# Patient Record
Sex: FEMALE | Race: WHITE
[De-identification: names, ages, dates, MRNs, and addresses within clinical notes are randomized per-mention and may not be internally consistent; named-entity substitution may affect disease eponyms.]

---

## 2018-08-17 ENCOUNTER — HOSPITAL ENCOUNTER (OUTPATIENT)
Dept: HOSPITAL 74 - JER | Age: 38
Setting detail: OBSERVATION
LOS: 3 days | Discharge: HOME | End: 2018-08-20
Attending: SPECIALIST | Admitting: SPECIALIST
Payer: COMMERCIAL

## 2018-08-17 VITALS — BODY MASS INDEX: 25.6 KG/M2

## 2018-08-17 DIAGNOSIS — Z91.018: ICD-10-CM

## 2018-08-17 DIAGNOSIS — R00.1: ICD-10-CM

## 2018-08-17 DIAGNOSIS — N13.2: Primary | ICD-10-CM

## 2018-08-17 DIAGNOSIS — Z91.010: ICD-10-CM

## 2018-08-17 DIAGNOSIS — R11.2: ICD-10-CM

## 2018-08-17 LAB
ALBUMIN SERPL-MCNC: 4.1 G/DL (ref 3.4–5)
ALP SERPL-CCNC: 78 U/L (ref 45–117)
ALT SERPL-CCNC: 28 U/L (ref 12–78)
ANION GAP SERPL CALC-SCNC: 10 MMOL/L (ref 8–16)
APPEARANCE UR: (no result)
AST SERPL-CCNC: 16 U/L (ref 15–37)
BILIRUB SERPL-MCNC: 0.6 MG/DL (ref 0.2–1)
BILIRUB UR STRIP.AUTO-MCNC: NEGATIVE MG/DL
BUN SERPL-MCNC: 19 MG/DL (ref 7–18)
CALCIUM SERPL-MCNC: 9.1 MG/DL (ref 8.5–10.1)
CHLORIDE SERPL-SCNC: 104 MMOL/L (ref 98–107)
CO2 SERPL-SCNC: 25 MMOL/L (ref 21–32)
COLOR UR: YELLOW
CREAT SERPL-MCNC: 0.8 MG/DL (ref 0.55–1.02)
DEPRECATED RDW RBC AUTO: 13.4 % (ref 11.6–15.6)
GLUCOSE SERPL-MCNC: 138 MG/DL (ref 74–106)
HCT VFR BLD CALC: 38.1 % (ref 32.4–45.2)
HGB BLD-MCNC: 12.9 GM/DL (ref 10.7–15.3)
KETONES UR QL STRIP: (no result)
LEUKOCYTE ESTERASE UR QL STRIP.AUTO: NEGATIVE
MCH RBC QN AUTO: 29.3 PG (ref 25.7–33.7)
MCHC RBC AUTO-ENTMCNC: 33.8 G/DL (ref 32–36)
MCV RBC: 86.7 FL (ref 80–96)
NITRITE UR QL STRIP: NEGATIVE
PH UR: 5 [PH] (ref 5–8)
PLATELET # BLD AUTO: 316 K/MM3 (ref 134–434)
PMV BLD: 8.9 FL (ref 7.5–11.1)
POTASSIUM SERPLBLD-SCNC: 4.1 MMOL/L (ref 3.5–5.1)
PROT SERPL-MCNC: 7.5 G/DL (ref 6.4–8.2)
PROT UR QL STRIP: NEGATIVE
PROT UR QL STRIP: NEGATIVE
RBC # BLD AUTO: 4.39 M/MM3 (ref 3.6–5.2)
SODIUM SERPL-SCNC: 139 MMOL/L (ref 136–145)
SP GR UR: 1.03 (ref 1–1.03)
UROBILINOGEN UR STRIP-MCNC: NEGATIVE MG/DL (ref 0.2–1)
WBC # BLD AUTO: 9.4 K/MM3 (ref 4–10)

## 2018-08-17 PROCEDURE — 3E0337Z INTRODUCTION OF ELECTROLYTIC AND WATER BALANCE SUBSTANCE INTO PERIPHERAL VEIN, PERCUTANEOUS APPROACH: ICD-10-PCS | Performed by: SPECIALIST

## 2018-08-17 PROCEDURE — G0378 HOSPITAL OBSERVATION PER HR: HCPCS

## 2018-08-17 PROCEDURE — 3E033NZ INTRODUCTION OF ANALGESICS, HYPNOTICS, SEDATIVES INTO PERIPHERAL VEIN, PERCUTANEOUS APPROACH: ICD-10-PCS | Performed by: SPECIALIST

## 2018-08-17 PROCEDURE — 3E0333Z INTRODUCTION OF ANTI-INFLAMMATORY INTO PERIPHERAL VEIN, PERCUTANEOUS APPROACH: ICD-10-PCS | Performed by: SPECIALIST

## 2018-08-17 RX ADMIN — SODIUM CHLORIDE SCH MLS/HR: 9 INJECTION, SOLUTION INTRAVENOUS at 19:35

## 2018-08-17 NOTE — HP
CHIEF COMPLAINT: Left Flank Pain, Nausea and Vomiting





PCP: Dr. Leo





HISTORY OF PRESENT ILLNESS:


39 y/o young woman with no significant medical history. Who presents to the ED 

with L- flank pain, N/V x today. Patient reports the pain started in her L- 

flank then radiated to her abdomen and down her left leg. She describes the 

pain as " severe back labor". She reports taking Motrin with no relief. 


Patient denies fever, chills, SOB, CP, palpitations, diarrhea, constipation, 

dysuria





ER course was notable for:


(1) spiral CT- 5mm obstructing stone in L- UVJ


(2) UA- +2 ketones


(3)





Recent Travel: None





PAST MEDICAL HISTORY:


See HPI





PAST SURGICAL HISTORY:


C- Section


 x2


Tubal Ligation





Social History:


Smoking: Never


Alcohol:  Denies


Drugs:   Denies





Family History:


Father:Renal Calculi


Sister: Renal Calculi





Allergies





fish derived Allergy (Unknown, Verified 18 09:43)


 Itching


 PATIENT STATES THAT SHE IS NOT AWARE OF WHAT THE REACTION WOULD BE WITH THIS 

FISH, STATES THAT IT IS SOME TYPE OF EXOTIC FISH AND SHE WAS ONLY MADE AWARE OF 

THE ALLERGY THROUGH ALLERGY TESTING 


ORANGES Allergy (Severe, Uncoded 18 09:43)


 Difficulty Breathing


 PATIENT STATES THAT THIS IS AN AIR BORN ALLERGY AND THAT HER THROAT CLOSES 


mushrooms Allergy (Mild, Uncoded 18 09:43)


 Rash


peanuts Adverse Reaction (Unknown, Uncoded 18 09:43)


 Itching


 PATIENT STATES THAT SHE REINTRODUCED PEANUTS TO HER DIET A FEW YEARS AGO AND 

HAS NOT HAD ANY FURTHER REACTIONS 








HOME MEDICATIONS:


 Home Medications











 Medication  Instructions  Recorded


 


NK [No Known Home Medication]  18








REVIEW OF SYSTEMS


CONSTITUTIONAL:  loss of appetite,


Absent:  fever, chills, diaphoresis, generalized weakness, malaise, weight 

change


HEENT: 


Absent:  rhinorrhea, nasal congestion, throat pain, throat swelling, difficulty 

swallowing, mouth swelling, ear pain, eye pain, visual changes


CARDIOVASCULAR: 


Absent: chest pain, syncope, palpitations, irregular heart rate, lightheadedness

, peripheral edema


RESPIRATORY: 


Absent: cough, shortness of breath, dyspnea with exertion, orthopnea, wheezing, 

stridor, hemoptysis


GASTROINTESTINAL: abdominal pain, nausea, vomiting


Absent: abdominal distension,diarrhea, constipation, melena, hematochezia


GENITOURINARY:  flank pain,


Absent: dysuria, frequency, urgency, hesitancy, hematuria, genital pain


MUSCULOSKELETAL: 


Absent: myalgia, arthralgia, joint swelling, back pain, neck pain


SKIN: 


Absent: rash, itching, pallor


HEMATOLOGIC/IMMUNOLOGIC: 


Absent: easy bleeding, easy bruising, lymphadenopathy, frequent infections


ENDOCRINE:


Absent: unexplained weight gain, unexplained weight loss, heat intolerance, 

cold intolerance


NEUROLOGIC: 


Absent: headache, focal weakness or paresthesias, dizziness, unsteady gait, 

seizure, mental status changes, bladder or bowel incontinence


PSYCHIATRIC: 


Absent: anxiety, depression, suicidal or homicidal ideation, hallucinations.








PHYSICAL EXAMINATION


 Vital Signs - 24 hr











  18





  09:45 12:37 16:42


 


Temperature 97.6 F 98.2 F 97.9 F


 


Pulse Rate 52 L  


 


Pulse Rate [   58 L





Left]   


 


Pulse Rate [  54 L 





Right Radial]   


 


Respiratory 18 17 18





Rate   


 


Blood Pressure 114/56  


 


Blood Pressure  117/71 





[Left Arm]   


 


Blood Pressure   101/70





[Right Arm]   


 


O2 Sat by Pulse 100 100 98





Oximetry (%)   














  18





  18:33 19:44


 


Temperature 98.1 F 


 


Pulse Rate 52 L 


 


Pulse Rate [  





Left]  


 


Pulse Rate [  





Right Radial]  


 


Respiratory 18 





Rate  


 


Blood Pressure 133/75 


 


Blood Pressure  





[Left Arm]  


 


Blood Pressure  





[Right Arm]  


 


O2 Sat by Pulse  98





Oximetry (%)  











GENERAL: Awake, alert, and fully oriented, in no acute distress.


HEAD: Normal with no signs of trauma.


EYES: Pupils equal, round and reactive to light, extraocular movements intact, 

sclera anicteric, conjunctiva clear. No lid lag.


EARS, NOSE, THROAT: Dry mucous membranes. Ears normal, nares patent, oropharynx 

clear without exudates.


NECK: Normal range of motion, supple without lymphadenopathy, JVD, or masses.


LUNGS: Breath sounds equal, clear to auscultation bilaterally. No wheezes, and 

no crackles. No accessory muscle use.


HEART: Regular rate and rhythm, normal S1 and S2 without murmur, rub or gallop.


ABDOMEN: RLQ/LLQ tenderness.  Soft, not distended, normoactive bowel sounds, no 

guarding, no rebound, no masses.  No hepatomegaly or  splenomegaly. 


MUSCULOSKELETAL:+ Left CVA tenderness.  Normal range of motion at all joints. 

No bony deformities or tenderness. No R- CVA Tenderness. 


UPPER EXTREMITIES: 2+ pulses, warm, well-perfused. No cyanosis. No clubbing. No 

peripheral edema.


LOWER EXTREMITIES: 2+ pulses, warm, well-perfused. No calf tenderness. No 

peripheral edema. 


NEUROLOGICAL:  Cranial nerves II-XII intact. Normal speech. Normal gait.


PSYCHIATRIC: Cooperative. Good eye contact. Appropriate mood and affect.


SKIN: Warm, dry, normal turgor, no rashes or lesions noted, normal capillary 

refill. 





 Laboratory Results - last 24 hr











  18





  10:11 10:11 10:11


 


WBC  9.4  


 


RBC  4.39  


 


Hgb  12.9  


 


Hct  38.1  D  


 


MCV  86.7  


 


MCH  29.3  


 


MCHC  33.8  


 


RDW  13.4  D  


 


Plt Count  316  D  


 


MPV  8.9  


 


Sodium    139


 


Potassium    4.1


 


Chloride    104


 


Carbon Dioxide    25


 


Anion Gap    10


 


BUN    19 H


 


Creatinine    0.8


 


Creat Clearance w eGFR    > 60


 


Random Glucose    138 H


 


Calcium    9.1


 


Total Bilirubin    0.6


 


AST    16


 


ALT    28


 


Alkaline Phosphatase    78


 


Total Protein    7.5


 


Albumin    4.1


 


Serum Pregnancy, Qual   Negative 


 


Urine Color   


 


Urine Appearance   


 


Urine pH   


 


Ur Specific Gravity   


 


Urine Protein   


 


Urine Glucose (UA)   


 


Urine Ketones   


 


Urine Blood   


 


Urine Nitrite   


 


Urine Bilirubin   


 


Urine Urobilinogen   


 


Ur Leukocyte Esterase   














  18





  12:30


 


WBC 


 


RBC 


 


Hgb 


 


Hct 


 


MCV 


 


MCH 


 


MCHC 


 


RDW 


 


Plt Count 


 


MPV 


 


Sodium 


 


Potassium 


 


Chloride 


 


Carbon Dioxide 


 


Anion Gap 


 


BUN 


 


Creatinine 


 


Creat Clearance w eGFR 


 


Random Glucose 


 


Calcium 


 


Total Bilirubin 


 


AST 


 


ALT 


 


Alkaline Phosphatase 


 


Total Protein 


 


Albumin 


 


Serum Pregnancy, Qual 


 


Urine Color  Yellow


 


Urine Appearance  Slcloudy


 


Urine pH  5.0


 


Ur Specific Gravity  1.027


 


Urine Protein  Negative


 


Urine Glucose (UA)  Negative


 


Urine Ketones  2+ H


 


Urine Blood  Negative


 


Urine Nitrite  Negative


 


Urine Bilirubin  Negative


 


Urine Urobilinogen  Negative


 


Ur Leukocyte Esterase  Negative











ASSESSMENT/PLAN:


39 y/o Placed in Observation for L- Flank Pain, Obstructing Renal Stone for 

evaluation of their emergent condition. 





Plan:





FEN


-NS@83ml/hr


- Replete lytes prn


- Clear Liquid Diet





DVT ppx


- OOB


- SCDs


- Consider AC if LOS > 48hrs





Code Status: Full Code





Dispo: Observation














Problem List





- Problem


(1) Lt flank pain


Assessment/Plan: 


- Likely secondary to renal stone


- Spiral CT- 5mm obstructing stone L UVJ, mild left renal hydrouteronephrosis


- UA- +2 ketones


- No leukocytosis, no neutrophilia


- Appreciate Urology consult


- Continue IVF


- Morphine Sulfate prn


- Repeat CBC, BMP in am


- Monitor vitals





Code(s): R10.9 - UNSPECIFIED ABDOMINAL PAIN   





(2) Renal stone


Assessment/Plan: 


- See above





Code(s): N20.0 - CALCULUS OF KIDNEY   





(3) Nausea & vomiting


Assessment/Plan: 


- Likely secondary to renal stone


- IVF


- Zofran prn


- Monitor BMP


- Monitor vitals


- UA +ketones


Code(s): R11.2 - NAUSEA WITH VOMITING, UNSPECIFIED   





Visit type





- Emergency Visit


Emergency Visit: Yes


ED Registration Date: 18


Care time: The patient presented to the Emergency Department on the above date 

and was hospitalized for further evaluation of their emergent condition.





- New Patient


This patient is new to me today: Yes


Date on this admission: 18





- Critical Care


Critical Care patient: No





Hospitalist Screening





- Colonoscopy Questionnaire


Colonoscopy Questionnaire: 





Colonoscopy Questionnaire








-   Patient:


50 - 75 years old and never had a screening colonoscopy: No


History of colon or rectal polyps, or CA: No


History of IBD, Crohn's disease or UC: No


History of abdominal radiation therapy as a child: No





-   Relative:


1 with colon or rectal CA, or polyps at age 60 or younger: No


Colon or rectal CA diagnosed at age 45 or younger: No


Multiple relatives with colon or rectal CA: No





-   Outcome:


Screening Result: Negative Screen

## 2018-08-17 NOTE — PDOC
Attending Attestation





- Physicial Exam


PE: 





08/17/18 11:12





Agree with resident exam.  Patient appears uncomfortable.  Abdomen is soft, non 

tender and non distended.  + L sided CVA tederness.  





- Medical Decision Making





08/17/18 11:18


Pt presents to the ED complaining of L flank pain along with severe nausea and 

vomiting.  Differential includes renal stone, less likely pyelonephritis, less 

likely muscular pain, less likely diverticulitis or ectopic pregnancy.  Will 

check labs and give Iv nausea and pain control.  Will check labs and CT abdomen 

pelvis to rule out nephrolithiasis. 





<Vicky Canales - Last Filed: 08/17/18 11:12>





- HPI


HPI: 





08/17/18 10:44


The patient is a 38 year old female with no significant PMH presenting with 

left sided flank pain and multiple episodes of nonbloody, nonbilious vomit 

since 5AM this morning. Patient describes the left sided flank pain as sudden 

onset, sharp, with radiation to the front of her abdomen. The patient denies 

similar symptoms in the past. Patient denies history of kidney stones. 


 


Patient denies fever, chills, diarrhea and constipation.


Denies dysuria, frequency, urgency and hematuria.





Allergies: fish, oranges, mushrooms, peanuts 


Past surgical history: None reported. 


Social history: No reported alcohol, drug, or cigarette use. 


PCP: Dr. Leo 





 


 








- Medical Decision Making


08/17/18 16:40


Imaging:  Spiral-renal stone CT


Reported by: Dr. Joseph


Impression: 5 mm obstructing stone at the left ureterovesical junction 

resulting in mild left renal hydrouteronephrosis. No gross renal stone is 

identified bilaterally. 











<Francoise Martino - Last Filed: 08/17/18 16:41>

## 2018-08-17 NOTE — PDOC
History of Present Illness





<Vicky Canales - Last Filed: 18 13:41>





- General


History Source: Patient


Exam Limitations: No Limitations





- History of Present Illness


Initial Comments: 





18 11:04


Patient is a 38F with history of bilateral tubal ligation 1 year ago here today 

complaining of sudden onset left sided flank pain with associated nausea, 

vomiting. Patient denies history of kidney stones. Denies dysuria and blood in 

urine. Last bowel movement yesterday. Patient states that last bowel movement 

was yesterday. Denies fevers, chills. Last menstrual period . Patient states 

that she just can't get comfortable.





<Sam Bedoya - Last Filed: 18 19:34>





- General


Chief Complaint: Pain


Stated Complaint: ABD PAIN


Time Seen by Provider: 18 09:49





Past History





<Vicky Canales - Last Filed: 18 13:41>





- Past Medical History


Asthma: No


Cancer: No


Cardiac Disorders: No


COPD: No


DVT: No


Diabetes: No


HTN: Yes (PREECLAMPSIA WITH PREVIOUS PREGNANCY)


Seizures: No


Thyroid Disease: No





- Surgical History


Abdominal Surgery: Yes (tubal ligation)





- Reproductive History


 (#): 1


Para: 2


Cervical CA: No


Dysfunctional Uterine Bleeding: No


Ectopic Pregnancy: No


Endometrial CA: No


Polycystic Ovaries: No


Therapeutic (s) & number: No


Tubal Ligation: No


Spontaneous : 0





- Suicide/Smoking/Psychosocial Hx


Smoking Status: No


Smoking History: Never smoked


Have you smoked in the past 12 months: No


Number of Cigarettes Smoked Daily: 0


Information on smoking cessation initiated: No


Hx Alcohol Use: No


Drug/Substance Use Hx: No


Substance Use Type: None


Hx Substance Use Treatment: No





<Sam Bedoya - Last Filed: 18 19:34>





- Past Medical History


Allergies/Adverse Reactions: 


 Allergies











Allergy/AdvReac Type Severity Reaction Status Date / Time


 


fish derived Allergy Unknown Itching Verified 18 09:43


 


ORANGES Allergy Severe Difficulty Uncoded 18 09:43





   Breathing  


 


mushrooms Allergy Mild Rash Uncoded 18 09:43


 


peanuts AdvReac Unknown Itching Uncoded 18 09:43











Home Medications: 


Ambulatory Orders





NK [No Known Home Medication]  18 











**Review of Systems





- Review of Systems


Comments:: 





18 11:06


GENERAL/CONSTITUTIONAL: No fever or chills. No weakness.


HEAD, EYES, EARS, NOSE AND THROAT: No change in vision. No sore throat.


CARDIOVASCULAR: No chest pain or shortness of breath


RESPIRATORY: No cough, wheezing, or hemoptysis.


GASTROINTESTINAL: +nausea, +vomiting. Negative diarrhea or constipation.


GENITOURINARY: No dysuria, frequency, or change in urination.


MUSCULOSKELETAL: No joint or muscle swelling or pain. No neck or back pain.


SKIN: No rash


NEUROLOGIC: No headache, vertigo, loss of consciousness, or change in strength/

sensation.


ENDOCRINE: No increased thirst. No abnormal weight change


HEMATOLOGIC/LYMPHATIC: No anemia, easy bleeding, or history of blood clots.


ALLERGIC/IMMUNOLOGIC: No hives or skin allergy.








<Sam Bedoya - Last Filed: 18 19:34>





*Physical Exam





- Vital Signs


 Last Vital Signs











Temp Pulse Resp BP Pulse Ox


 


 98.2 F   54 L  17   117/71   100 


 


 18 12:37  18 12:37  18 12:37  18 12:37  18 12:37














<Vicky Canales - Last Filed: 18 13:41>





- Vital Signs


 Last Vital Signs











Temp Pulse Resp BP Pulse Ox


 


 97.6 F   52 L  18   114/56   100 


 


 18 09:45  18 09:45  18 09:45  18 09:45  18 09:45














- Physical Exam


Comments: 





18 11:08


GENERAL: Awake, alert, and fully oriented, in acute distress, rolling in bed


HEAD: No signs of trauma, normocephalic, atraumatic


EYES: PERRLA, EOMI, sclera anicteric, conjunctiva clear


ENT: Auricles normal inspection, hearing grossly normal, nares patent, 

oropharynx clear without


exudates. Moist mucosa


NECK: Normal ROM, supple, no lymphadenopathy, JVD, or masses


LUNGS: No distress, speaks full sentences, clear to auscultation bilaterally


HEART: Regular rate and rhythm, normal S1 and S2, no murmurs, rubs or gallops, 

peripheral pulses normal and equal bilaterally.


ABDOMEN: +left sided CVA tenderness, normoactive bowel sounds.  No guarding, no 

rebound.  No masses


EXTREMITIES: Normal inspection, Normal range of motion, no edema.  No clubbing 

or cyanosis.


NEUROLOGICAL: Cranial nerves II through XII grossly intact.  Normal speech, no 

focal sensorimotor deficits


SKIN: Warm, Dry, normal turgor, no rashes or lesions noted.








<Sam Bedoya - Last Filed: 18 19:34>





ED Treatment Course





- LABORATORY


CBC & Chemistry Diagram: 


 18 10:11





 18 10:11





- ADDITIONAL ORDERS


Additional order review: 


 Laboratory  Results











  18





  12:30 10:11 10:11


 


Sodium   139 


 


Potassium   4.1 


 


Chloride   104 


 


Carbon Dioxide   25 


 


Anion Gap   10 


 


BUN   19 H 


 


Creatinine   0.8 


 


Creat Clearance w eGFR   > 60 


 


Random Glucose   138 H 


 


Calcium   9.1 


 


Total Bilirubin   0.6 


 


AST   16 


 


ALT   28 


 


Alkaline Phosphatase   78 


 


Total Protein   7.5 


 


Albumin   4.1 


 


Serum Pregnancy, Qual    Negative


 


Urine Color  Yellow  


 


Urine Appearance  Slcloudy  


 


Urine pH  5.0  


 


Ur Specific Gravity  1.027  


 


Urine Protein  Negative  


 


Urine Glucose (UA)  Negative  


 


Urine Ketones  2+ H  


 


Urine Blood  Negative  


 


Urine Nitrite  Negative  


 


Urine Bilirubin  Negative  


 


Urine Urobilinogen  Negative  


 


Ur Leukocyte Esterase  Negative  








 











  18





  10:11


 


RBC  4.39


 


MCV  86.7


 


MCHC  33.8


 


RDW  13.4  D


 


MPV  8.9














- Medications


Given in the ED: 


ED Medications














Discontinued Medications














Generic Name Dose Route Start Last Admin





  Trade Name Wernerq  PRN Reason Stop Dose Admin


 


Ketorolac Tromethamine  30 mg  18 09:59  18 10:17





  Toradol Injection -  IVPUSH  18 10:00  30 mg





  ONCE ONE   Administration





     





     





     





     


 


Morphine Sulfate  4 mg  18 09:59  18 10:16





  Morphine Injection -  IVPUSH  18 10:00  4 mg





  ONCE ONE   Administration





     





     





     





     


 


Morphine Sulfate  4 mg  18 10:39  18 10:51





  Morphine Injection -  IVPUSH  18 10:40  4 mg





  ONCE ONE   Administration





     





     





     





     


 


Morphine Sulfate  4 mg  18 12:51  18 13:01





  Morphine Injection -  IVPUSH  18 12:52  4 mg





  ONCE ONE   Administration





     





     





     





     


 


Ondansetron HCl  4 mg  18 09:59  18 10:17





  Zofran Injection  IVPUSH  18 10:00  4 mg





  ONCE ONE   Administration





     





     





     





     


 


Ondansetron HCl  4 mg  18 10:39  18 10:51





  Zofran Injection  IVPUSH  18 10:40  4 mg





  ONCE ONE   Administration





     





     





     





     


 


Oxycodone/Acetaminophen  1 combo  18 12:56  18 13:02





  Percocet 5/325 -  PO  18 12:57  Not Given





  ONCE ONE   





     





     





     





     














<Vicky Canales - Last Filed: 18 13:41>





- LABORATORY


CBC & Chemistry Diagram: 


 18 10:11





 18 10:11





- ADDITIONAL ORDERS


Additional order review: 


 Laboratory  Results











  18





  10:11


 


Serum Pregnancy, Qual  Negative








 











  18





  10:11


 


RBC  4.39


 


MCV  86.7


 


MCHC  33.8


 


RDW  13.4  D


 


MPV  8.9














- Medications


Given in the ED: 


ED Medications














Discontinued Medications














Generic Name Dose Route Start Last Admin





  Trade Name Freq  PRN Reason Stop Dose Admin


 


Ketorolac Tromethamine  30 mg  18 09:59  18 10:17





  Toradol Injection -  IVPUSH  18 10:00  30 mg





  ONCE ONE   Administration





     





     





     





     


 


Morphine Sulfate  4 mg  18 09:59  18 10:16





  Morphine Injection -  IVPUSH  18 10:00  4 mg





  ONCE ONE   Administration





     





     





     





     


 


Morphine Sulfate  4 mg  18 10:39  18 10:51





  Morphine Injection -  IVPUSH  18 10:40  4 mg





  ONCE ONE   Administration





     





     





     





     


 


Ondansetron HCl  4 mg  18 09:59  18 10:17





  Zofran Injection  IVPUSH  18 10:00  4 mg





  ONCE ONE   Administration





     





     





     





     


 


Ondansetron HCl  4 mg  18 10:39  18 10:51





  Zofran Injection  IVPUSH  18 10:40  4 mg





  ONCE ONE   Administration





     





     





     





     














<Sam Bedoya - Last Filed: 18 19:34>





Medical Decision Making





- Medical Decision Making





18 11:14


Patient is a 38F with history of BTL here today with flank pain. Exam and 

history consistent with kidney stone. Vital signs normal and stable. Will 

evaluate with cbc, cmp, ua/uc, spiral ct. Given morphine, zofran and toradol. 


18 13:33


 Laboratory Tests











  18





  10:11 10:11 10:11


 


WBC  9.4  


 


Hgb  12.9  


 


Plt Count  316  D  


 


BUN    19 H


 


Creatinine    0.8


 


Serum Pregnancy, Qual   Negative 


 


Urine Ketones   


 


Urine Nitrite   


 


Ur Leukocyte Esterase   














  18





  12:30


 


WBC 


 


Hgb 


 


Plt Count 


 


BUN 


 


Creatinine 


 


Serum Pregnancy, Qual 


 


Urine Ketones  2+ H


 


Urine Nitrite  Negative


 


Ur Leukocyte Esterase  Negative








CBC normal. CMP reassuring. UA shows ketones no blood. CT shows 5mm obstructing 

stone. 





Admitted for pain control to obs.








<Sam Bedoya - Last Filed: 18 19:34>





*DC/Admit/Observation/Transfer





- Discharge Dispostion


Decision to Admit order: Yes





<Vicky Canales - Last Filed: 18 13:41>





<Sam Bedoya - Last Filed: 18 19:34>


Diagnosis at time of Disposition: 


 Renal stone








- Discharge Dispostion


Condition at time of disposition: Good

## 2018-08-18 LAB
ALBUMIN SERPL-MCNC: 3.2 G/DL (ref 3.4–5)
ALP SERPL-CCNC: 65 U/L (ref 45–117)
ALT SERPL-CCNC: 21 U/L (ref 12–78)
ANION GAP SERPL CALC-SCNC: 7 MMOL/L (ref 8–16)
AST SERPL-CCNC: 14 U/L (ref 15–37)
BILIRUB SERPL-MCNC: 0.4 MG/DL (ref 0.2–1)
BUN SERPL-MCNC: 14 MG/DL (ref 7–18)
CALCIUM SERPL-MCNC: 8.2 MG/DL (ref 8.5–10.1)
CHLORIDE SERPL-SCNC: 110 MMOL/L (ref 98–107)
CO2 SERPL-SCNC: 28 MMOL/L (ref 21–32)
CREAT SERPL-MCNC: 0.5 MG/DL (ref 0.55–1.02)
DEPRECATED RDW RBC AUTO: 13.4 % (ref 11.6–15.6)
GLUCOSE SERPL-MCNC: 83 MG/DL (ref 74–106)
HCT VFR BLD CALC: 34.2 % (ref 32.4–45.2)
HGB BLD-MCNC: 11.5 GM/DL (ref 10.7–15.3)
MCH RBC QN AUTO: 29.4 PG (ref 25.7–33.7)
MCHC RBC AUTO-ENTMCNC: 33.7 G/DL (ref 32–36)
MCV RBC: 87.1 FL (ref 80–96)
PLATELET # BLD AUTO: 258 K/MM3 (ref 134–434)
PMV BLD: 9.3 FL (ref 7.5–11.1)
POTASSIUM SERPLBLD-SCNC: 4.2 MMOL/L (ref 3.5–5.1)
PROT SERPL-MCNC: 6.1 G/DL (ref 6.4–8.2)
RBC # BLD AUTO: 3.93 M/MM3 (ref 3.6–5.2)
SODIUM SERPL-SCNC: 145 MMOL/L (ref 136–145)
WBC # BLD AUTO: 8.1 K/MM3 (ref 4–10)

## 2018-08-18 RX ADMIN — SODIUM CHLORIDE SCH MLS/HR: 9 INJECTION, SOLUTION INTRAVENOUS at 08:58

## 2018-08-18 NOTE — PN
Progress Note, Physician


Chief Complaint: 





still C/O flank pain


History of Present Illness: 





38 yrs old healthy F admitted with Left renal colic





- Current Medication List


Current Medications: 


Active Medications





Sodium Chloride (Normal Saline -)  1,000 mls @ 83 mls/hr IV ASDIR RODRIGO


   Last Admin: 08/18/18 08:58 Dose:  83 mls/hr


Morphine Sulfate (Morphine Sulfate)  2 mg IVPUSH Q4H PRN


   PRN Reason: PAIN LEVEL 4 - 6


   Last Admin: 08/17/18 19:02 Dose:  2 mg











- Objective


Vital Signs: 


 Vital Signs











Temperature  97.6 F   08/18/18 06:00


 


Pulse Rate  56 L  08/18/18 06:00


 


Respiratory Rate  18   08/18/18 06:00


 


Blood Pressure  106/56   08/18/18 06:00


 


O2 Sat by Pulse Oximetry (%)  98   08/18/18 02:16











Constitutional: Yes: Well Nourished, No Distress


Eyes: Yes: Conjunctiva Clear, EOM Intact


HENT: Yes: Atraumatic, Normocephalic


Neck: Yes: Supple, Trachea Midline.  No: Decreased ROM, Lymphadenopathy


Cardiovascular: Yes: Regular Rate and Rhythm, S1, S2.  No: JVD, Gallop, Murmur


Respiratory: Yes: Regular, CTA Bilaterally


Gastrointestinal: Yes: Normal Bowel Sounds, Soft


Genitourinary: No: CVA Tenderness - Left, CVA Tenderness - Right, Hematuria


Edema: No


Peripheral Pulses: Left Doralis Pedis: 2+, Right Dorsalis Pedis: 2+


Neurological: Yes: WNL, Alert, Oriented


Labs: 


 CBC, BMP





 08/18/18 07:14 





 08/18/18 07:14 











- ....Imaging


Cat Scan: Report Reviewed (Left 5 mm stone at uretro Vesiclre Junction with 

hydronephrosis)





Problem List





- Problems


(1) Renal colic on left side


Assessment/Plan: 


 Due to obstructed stone pain control IV Hydration,  consult, Flomax


Code(s): N23 - UNSPECIFIED RENAL COLIC   





(2) Hydronephrosis, left


Assessment/Plan: 


Due to Obstructed calculi at Left UreteroVesicle Junction


Code(s): N13.30 - UNSPECIFIED HYDRONEPHROSIS   





(3) Nausea & vomiting


Assessment/Plan: 


Resolved due to renalcolic


Code(s): R11.2 - NAUSEA WITH VOMITING, UNSPECIFIED   





(4) Bradycardia


Assessment/Plan: 


Asymptomatic excellent Et no c/o syncope or presyncope symptoms will F/U EKG


Code(s): R00.1 - BRADYCARDIA, UNSPECIFIED

## 2018-08-18 NOTE — EKG
Test Reason : 

Blood Pressure : ***/*** mmHG

Vent. Rate : 073 BPM     Atrial Rate : 073 BPM

   P-R Int : 144 ms          QRS Dur : 104 ms

    QT Int : 404 ms       P-R-T Axes : 058 053 045 degrees

   QTc Int : 445 ms

 

NORMAL SINUS RHYTHM WITH SINUS ARRHYTHMIA

NORMAL ECG

NO PREVIOUS ECGS AVAILABLE

Confirmed by MD TONYA, NIMESH ([id number]) on 8/18/2018 6:57:31 PM

 

Referred By: CHANI IBARRA           Confirmed By:NIMESH CASTANEDA MD

## 2018-08-18 NOTE — CONSULT
Consult - text type





- Consultation


Consultation Note: 





37 yo female w 5mm left renal calculus and hydro admitted for pain control


No signs of SIRS


Pt comfortable now 


discussed tx options if does not pass lithotripsy vs ureteroscopy


Will follow 


Will need more urgent tx if fever N?V and worsening pain

## 2018-08-19 LAB
ANION GAP SERPL CALC-SCNC: 6 MMOL/L (ref 8–16)
BASOPHILS # BLD: 0.3 % (ref 0–2)
BUN SERPL-MCNC: 9 MG/DL (ref 7–18)
CALCIUM SERPL-MCNC: 8.4 MG/DL (ref 8.5–10.1)
CHLORIDE SERPL-SCNC: 105 MMOL/L (ref 98–107)
CO2 SERPL-SCNC: 29 MMOL/L (ref 21–32)
CREAT SERPL-MCNC: 0.5 MG/DL (ref 0.55–1.02)
DEPRECATED RDW RBC AUTO: 13.6 % (ref 11.6–15.6)
EOSINOPHIL # BLD: 2.4 % (ref 0–4.5)
GLUCOSE SERPL-MCNC: 84 MG/DL (ref 74–106)
HCT VFR BLD CALC: 35.3 % (ref 32.4–45.2)
HGB BLD-MCNC: 12.1 GM/DL (ref 10.7–15.3)
LYMPHOCYTES # BLD: 41.1 % (ref 8–40)
MCH RBC QN AUTO: 29.6 PG (ref 25.7–33.7)
MCHC RBC AUTO-ENTMCNC: 34.1 G/DL (ref 32–36)
MCV RBC: 86.8 FL (ref 80–96)
MONOCYTES # BLD AUTO: 8.4 % (ref 3.8–10.2)
NEUTROPHILS # BLD: 47.8 % (ref 42.8–82.8)
PLATELET # BLD AUTO: 264 K/MM3 (ref 134–434)
PMV BLD: 9.6 FL (ref 7.5–11.1)
POTASSIUM SERPLBLD-SCNC: 4.4 MMOL/L (ref 3.5–5.1)
RBC # BLD AUTO: 4.07 M/MM3 (ref 3.6–5.2)
SODIUM SERPL-SCNC: 140 MMOL/L (ref 136–145)
URATE SERPL-SCNC: 3.7 MG/DL (ref 2.6–7.2)
WBC # BLD AUTO: 5.6 K/MM3 (ref 4–10)

## 2018-08-19 NOTE — PN
Progress Note, Physician


Chief Complaint: 


Improved denies any nausea or vomiting


History of Present Illness: 





38 yrs old healthy F admitted with Left renal colic





- Current Medication List


Current Medications: 


Active Medications





Morphine Sulfate (Morphine Sulfate)  2 mg IVPUSH Q4H PRN


   PRN Reason: PAIN LEVEL 4 - 6


   Last Admin: 08/17/18 19:02 Dose:  2 mg











- Objective


Vital Signs: 


 Vital Signs











Temperature  97.6 F   08/19/18 07:00


 


Pulse Rate  55 L  08/19/18 07:00


 


Respiratory Rate  18   08/19/18 07:00


 


Blood Pressure  97/58   08/19/18 07:00


 


O2 Sat by Pulse Oximetry (%)  99   08/18/18 23:39




















Constitutional: Yes: Well Nourished, No Distress


HEENT:  Atraumatic, Normocephalic Supple, Trachea Midline.  No: Decreased ROM, 

Lymphadenopathy,Conjunctiva Clear, EOM Intact


Cardiovascular: Regular Rate and Rhythm, S1, S2.  No: JVD, Gallop, Murmur


Respiratory: Regular, CTA Bilaterally


Gastrointestinal: Normal Bowel Sounds, Soft


Genitourinary: No: CVA Tenderness - Left, CVA Tenderness - Right, Hematuria


LE : No edema feet, Peripheral Pulses: Left Doralis Pedis: 2+, Right Dorsalis 

Pedis: 2+


Neurological: WNL, Alert, Oriented








Labs: 


 CBC, BMP





 08/19/18 08:00 











Problem List





- Problems


(1) Renal colic on left side


Assessment/Plan: 


 Due to obstructed stone pain control IV Hydration,  consult, Flomax


Code(s): N23 - UNSPECIFIED RENAL COLIC   





(2) Hydronephrosis, left


Assessment/Plan: 


Due to Obstructed calculi at Left [place], will f/u  renal 

ultrasound


Code(s): N13.30 - UNSPECIFIED HYDRONEPHROSIS   





(3) Nausea & vomiting


Assessment/Plan: 


Resolved due to renalcolic


Code(s): R11.2 - NAUSEA WITH VOMITING, UNSPECIFIED   





(4) Bradycardia


Assessment/Plan: 


Resolved most likely vagogenic due to pain EKG NSr at 73


Code(s): R00.1 - BRADYCARDIA, UNSPECIFIED

## 2018-08-20 VITALS — HEART RATE: 73 BPM | TEMPERATURE: 99.1 F | DIASTOLIC BLOOD PRESSURE: 82 MMHG | SYSTOLIC BLOOD PRESSURE: 124 MMHG

## 2018-08-20 NOTE — DS
Physical Examination


Vital Signs: 


 Vital Signs











Temperature  98.2 F   08/20/18 16:30


 


Pulse Rate  66   08/20/18 16:30


 


Respiratory Rate  20   08/20/18 16:30


 


Blood Pressure  112/75   08/20/18 16:30


 


O2 Sat by Pulse Oximetry (%)  99   08/20/18 10:00











Findings/Remarks: 











37 y/o young woman with no significant medical history. Who presents to the ED 

with L- flank pain, N/V x one day. Patient reports the pain started in her L- 

flank then radiated to her abdomen and down her left leg. She describes the 

pain as " severe back labor". She reports taking Motrin with no relief. 


Patient denied fever, chills, SOB, CP, palpitations, diarrhea, constipation, 

dysuria





ER course was notable for:


(1) spiral CT- 5mm obstructing stone in L- UVJ


(2) UA- +2 ketones


(3)





patient was admitted treated with IV fluids  / pain management and seen by  

over the weekend -- she has remained with mild pain for last 36hrs -toleration 

PO well  / no further nausea or vomiting 





She has been instructed to continue with increased PO fluids and strain all 

urine.  She will be d/c home with  follow up for possible lithotripsy.


Instructed to call office if any changes to current condition.








Constitutional: Yes: Well Nourished, No Distress, Calm


Eyes: Yes: WNL, Conjunctiva Clear, EOM Intact


HENT: Yes: Atraumatic, Normocephalic


Neck: Yes: Supple, Trachea Midline


Cardiovascular: Yes: Regular Rate and Rhythm


Respiratory: Yes: Regular, CTA Bilaterally


Gastrointestinal: Yes: Normal Bowel Sounds, Soft.  No: Tenderness, Tenderness, 

Rebound


...Rectal Exam: Yes: Deferred


Renal/: Yes: CVA Tenderness - Left (mild).  No: Bladder Distention, Hematuria

, Urethral Discharge, Vaginal Discharge


Breast(s): Yes: WNL


Musculoskeletal: Yes: Back Pain


Extremities: Yes: Amputation


Edema: No


Peripheral Pulses WNL: Yes


Peripheral Pulses: Left Radial: 2+, Right Radial: 2+, Left Doralis Pedis: 2+, 

Right Dorsalis Pedis: 2+, Left Femoral: 2+, Right Femoral: 2+


Integumentary: Yes: WNL


Neurological: Yes: Alert, Oriented


...Motor Strength: WNL


Psychiatric: Yes: Alert, Oriented


Labs: 


 CBC, BMP





 08/19/18 08:00 





 08/19/18 08:00 











Discharge Summary


Reason For Visit: CALCULUS OF KIDNEY


Current Active Problems





Bradycardia (Acute)


Hydronephrosis, left (Acute)


Lt flank pain (Acute)


Nausea & vomiting (Acute)


Renal colic on left side (Acute)


Renal stone (Acute)








Condition: Improved





- Instructions


Referrals: 


Karley Leo MD [Primary Care Provider] - 


Disposition: HOME





- Home Medications


Comprehensive Discharge Medication List: 


Ambulatory Orders





NK [No Known Home Medication]  08/17/18

## 2019-02-15 ENCOUNTER — HOSPITAL ENCOUNTER (EMERGENCY)
Dept: HOSPITAL 74 - JER | Age: 39
Discharge: HOME | End: 2019-02-15
Payer: COMMERCIAL

## 2019-02-15 VITALS — DIASTOLIC BLOOD PRESSURE: 75 MMHG | SYSTOLIC BLOOD PRESSURE: 118 MMHG | HEART RATE: 69 BPM | TEMPERATURE: 98.1 F

## 2019-02-15 VITALS — BODY MASS INDEX: 26.2 KG/M2

## 2019-02-15 DIAGNOSIS — R10.31: Primary | ICD-10-CM

## 2019-02-15 DIAGNOSIS — Z87.442: ICD-10-CM

## 2019-02-15 LAB
ALBUMIN SERPL-MCNC: 4 G/DL (ref 3.4–5)
ALP SERPL-CCNC: 75 U/L (ref 45–117)
ALT SERPL-CCNC: 19 U/L (ref 13–61)
ANION GAP SERPL CALC-SCNC: 5 MMOL/L (ref 8–16)
APPEARANCE UR: CLEAR
AST SERPL-CCNC: 13 U/L (ref 15–37)
BASOPHILS # BLD: 0.2 % (ref 0–2)
BILIRUB SERPL-MCNC: 0.3 MG/DL (ref 0.2–1)
BILIRUB UR STRIP.AUTO-MCNC: NEGATIVE MG/DL
BUN SERPL-MCNC: 12 MG/DL (ref 7–18)
CALCIUM SERPL-MCNC: 9.6 MG/DL (ref 8.5–10.1)
CHLORIDE SERPL-SCNC: 105 MMOL/L (ref 98–107)
CO2 SERPL-SCNC: 29 MMOL/L (ref 21–32)
COLOR UR: YELLOW
CREAT SERPL-MCNC: 0.6 MG/DL (ref 0.55–1.3)
DEPRECATED RDW RBC AUTO: 13.4 % (ref 11.6–15.6)
EOSINOPHIL # BLD: 1.9 % (ref 0–4.5)
GLUCOSE SERPL-MCNC: 123 MG/DL (ref 74–106)
HCT VFR BLD CALC: 38.2 % (ref 32.4–45.2)
HGB BLD-MCNC: 13.2 GM/DL (ref 10.7–15.3)
KETONES UR QL STRIP: NEGATIVE
LEUKOCYTE ESTERASE UR QL STRIP.AUTO: NEGATIVE
LYMPHOCYTES # BLD: 40.5 % (ref 8–40)
MCH RBC QN AUTO: 30.2 PG (ref 25.7–33.7)
MCHC RBC AUTO-ENTMCNC: 34.7 G/DL (ref 32–36)
MCV RBC: 87.2 FL (ref 80–96)
MONOCYTES # BLD AUTO: 8 % (ref 3.8–10.2)
NEUTROPHILS # BLD: 49.4 % (ref 42.8–82.8)
NITRITE UR QL STRIP: NEGATIVE
PH UR: 5 [PH] (ref 5–8)
PLATELET # BLD AUTO: 298 K/MM3 (ref 134–434)
PMV BLD: 8.5 FL (ref 7.5–11.1)
POTASSIUM SERPLBLD-SCNC: 4 MMOL/L (ref 3.5–5.1)
PROT SERPL-MCNC: 7.6 G/DL (ref 6.4–8.2)
PROT UR QL STRIP: NEGATIVE
PROT UR QL STRIP: NEGATIVE
RBC # BLD AUTO: 4.38 M/MM3 (ref 3.6–5.2)
SODIUM SERPL-SCNC: 139 MMOL/L (ref 136–145)
SP GR UR: 1.02 (ref 1.01–1.03)
UROBILINOGEN UR STRIP-MCNC: NEGATIVE MG/DL (ref 0.2–1)
WBC # BLD AUTO: 7.7 K/MM3 (ref 4–10)

## 2019-02-15 PROCEDURE — 3E0337Z INTRODUCTION OF ELECTROLYTIC AND WATER BALANCE SUBSTANCE INTO PERIPHERAL VEIN, PERCUTANEOUS APPROACH: ICD-10-PCS

## 2019-02-15 PROCEDURE — 3E033NZ INTRODUCTION OF ANALGESICS, HYPNOTICS, SEDATIVES INTO PERIPHERAL VEIN, PERCUTANEOUS APPROACH: ICD-10-PCS

## 2019-02-15 NOTE — PDOC
Rapid Medical Evaluation


Time Seen by Provider: 02/15/19 17:59


Medical Evaluation: 


 Allergies











Allergy/AdvReac Type Severity Reaction Status Date / Time


 


fish derived Allergy Unknown Itching Verified 08/17/18 09:43


 


ORANGES Allergy Severe Difficulty Uncoded 08/17/18 09:43





   Breathing  


 


mushrooms Allergy Mild Rash Uncoded 08/17/18 09:43


 


peanuts AdvReac Unknown Itching Uncoded 08/17/18 09:43











02/15/19 17:59


I performed a brief in-person evaluation.


Chief complaint: Right flank pain (history of same, last 8/2018, passed on own)


Pertinent physical exam findings:  Right CVA tenderness, some distress 

secondary to pain


I have ordered the following: UA/culture, urine pregnancy, CBC, CMP





Patient will proceed to the ED for further evaluation.





**Discharge Disposition





- Diagnosis


 Flank pain








- Referrals





- Patient Instructions





- Post Discharge Activity

## 2019-02-15 NOTE — PDOC
History of Present Illness





- General


Chief Complaint: Pain, Acute


Stated Complaint: BACK PAIN


Time Seen by Provider: 02/15/19 17:59


History Source: Patient





- History of Present Illness


Initial Comments: 





02/15/19 19:34


38 yr old woman with hx of renal stones (last episode 2018) present with 

sharp continuous right sided flank pain radiating down to her groin a/w nausea. 

symptoms intermittently first started 2 weeks ago and started to progressively 

worsen since Saturday with sharp stabbing continuously starting 1pm today.


also c/o left upper nonradiating chest pain that feels like cramping occurring 

at the same time as the right flank pain that started at 6:00pm on her drive to 

the ED.


denies palpitations, vomiting, fevers, dysuria, headache, weightloss, 





Sister with hx of calcium renal stones, father with renal stones and DM


Surghx:  14yrs ago, tubal ligation 14mo ago. 








Past History





- Past Medical History


Allergies/Adverse Reactions: 


 Allergies











Allergy/AdvReac Type Severity Reaction Status Date / Time


 


fish derived Allergy Unknown Itching Verified 02/15/19 18:00


 


ORANGES Allergy Severe Difficulty Uncoded 02/15/19 18:00





   Breathing  


 


mushrooms Allergy Mild Rash Uncoded 02/15/19 18:00


 


peanuts AdvReac Unknown Itching Uncoded 02/15/19 18:00











Home Medications: 


Ambulatory Orders





Naproxen [Naprosyn -] 500 mg PO BID #10 tablet 02/15/19 


Polyethylene Glycol 3350 [Miralax (For Daily Use) -] 17 gm PO DAILY #1 bottle 02

/15/19 








Asthma: No


Cancer: No


Cardiac Disorders: No


COPD: No


DVT: No


Diabetes: No


 Disorders: Yes (renal stones)


HTN: Yes (PREECLAMPSIA WITH PREVIOUS PREGNANCY)


Seizures: No


Thyroid Disease: No





- Surgical History


Abdominal Surgery: Yes (tubal ligation)





- Reproductive History


 (#): 1


Para: 2


Cervical CA: No


Dysfunctional Uterine Bleeding: No


Ectopic Pregnancy: No


Endometrial CA: No


Polycystic Ovaries: No


Therapeutic (s) & number: No


Tubal Ligation: No


Spontaneous : 0





- Suicide/Smoking/Psychosocial Hx


Smoking Status: No


Smoking History: Unknown if ever smoked


Have you smoked in the past 12 months: No


Number of Cigarettes Smoked Daily: 0


Hx Alcohol Use: No


Drug/Substance Use Hx: No


Substance Use Type: None


Hx Substance Use Treatment: No





*Physical Exam





- Vital Signs


 Last Vital Signs











Temp Pulse Resp BP Pulse Ox


 


 98.1 F   69   22 H  118/75   100 


 


 02/15/19 18:02  02/15/19 18:02  02/15/19 18:02  02/15/19 18:02  02/15/19 18:02














Moderate Sedation





- Procedure Monitoring


Vital Signs: 


Procedure Monitoring Vital Signs











Temperature  98.1 F   02/15/19 18:02


 


Pulse Rate  69   02/15/19 18:02


 


Respiratory Rate  22 H  02/15/19 18:02


 


Blood Pressure  118/75   02/15/19 18:02


 


O2 Sat by Pulse Oximetry (%)  100   02/15/19 18:02











ED Treatment Course





- LABORATORY


CBC & Chemistry Diagram: 


 02/15/19 18:00





 02/15/19 18:00





- ADDITIONAL ORDERS


Additional order review: 


 Laboratory  Results











  02/15/19 02/15/19





  18:50 18:00


 


Sodium   139


 


Potassium   4.0


 


Chloride   105


 


Carbon Dioxide   29


 


Anion Gap   5 L


 


BUN   12


 


Creatinine   0.6


 


Creat Clearance w eGFR   > 60


 


Random Glucose   123 H


 


Calcium   9.6


 


Total Bilirubin   0.3


 


AST   13 L


 


ALT   19


 


Alkaline Phosphatase   75


 


Total Protein   7.6


 


Albumin   4.0


 


Urine Color  Yellow 


 


Urine Appearance  Clear 


 


Urine pH  5.0 


 


Ur Specific Gravity  1.024 


 


Urine Protein  Negative 


 


Urine Glucose (UA)  Negative 


 


Urine Ketones  Negative 


 


Urine Blood  Negative 


 


Urine Nitrite  Negative 


 


Urine Bilirubin  Negative 


 


Urine Urobilinogen  Negative 


 


Ur Leukocyte Esterase  Negative 


 


Urine HCG, Qual  Negative 








 











  02/15/19





  18:00


 


RBC  4.38


 


MCV  87.2


 


MCHC  34.7


 


RDW  13.4


 


MPV  8.5  D


 


Neutrophils %  49.4


 


Lymphocytes %  40.5 H


 


Monocytes %  8.0


 


Eosinophils %  1.9


 


Basophils %  0.2














- RADIOLOGY


Radiology Studies Ordered: 














 Category Date Time Status


 


 SPIRAL- RENAL-STONE CT [CT] Stat CT Scan  02/15/19 19:33 Ordered














Medical Decision Making





- Medical Decision Making





02/15/19 21:42


labs and ua normalm check ultrasound and spiral CT to eval for renal stones. 


imaging negative for uropathy, does show moderate fecal retention. 


possible that stone may have passed. 


will dc home with naproxyn, oral hydration and stool softener with outpatient f/

u with PCP. Dr. Leo aware of plan to dc pt home if no obstructive uropathy 

identified.





*DC/Admit/Observation/Transfer


Diagnosis at time of Disposition: 


 Flank pain








- Discharge Dispostion


Disposition: HOME


Condition at time of disposition: Stable


Decision to Admit order: No





- Prescriptions


Prescriptions: 


Naproxen [Naprosyn -] 500 mg PO BID #10 tablet


Polyethylene Glycol 3350 [Miralax (For Daily Use) -] 17 gm PO DAILY #1 bottle





- Referrals


Referrals: 


Karley Leo MD [Primary Care Provider] - 





- Patient Instructions


Printed Discharge Instructions:  DI for Kidney Stones, DI for Constipation


Additional Instructions: 


You were evaluated for right sided pain today. 


The ultrasound and the CT scan did not show any obstructing renal stone.


The CT scan did show moderate amount of constipation. 





Please follow-up with Dr. Leo for post-hospital evaluation. 


You are being discharged with naproxyn for pain control, please take it with 

food and plenty of water. 


You can take an over-the-counter stool softners or laxatives for your 

constipation. 


A prescription for naprosyn and miralax has been sent to your preferred pharmacy


If you develop any worsening pain, fevers, trouble urinating or any new 

symptoms please return to the hospital.





- Post Discharge Activity

## 2019-02-15 NOTE — PDOC
Attending Attestation





- HPI


HPI: 


The patient is a 38 year old female, with a significant PMH of renal stones, 

who presents to the emergency department today complaining of right-sided 

abdominal pain for 2 weeks. Patient notes that the pain is sharp and constant 

in nature, radiating from the right side of her abdomen to her right flank and 

right groin. She reports associated intermittent nausea, bloating, and 

intestinal burning. Patient notes that she thought it was acid reflux, and 

tried Pepto Bismol with minimal relief. She states the symptoms began two weeks 

ago, but have gotten progressively worse in the past 6 days, and became 

unbearable today. She notes her PCP recommended that she come to the ED if the 

pain persists. Patient confirms family history of calcium renal stones (sister)

, renal stones (father), and DM (father). 





The patient denies chest pain, shortness of breath, headache and dizziness.


Denies fever, chills, vomit, diarrhea and constipation.


Denies dysuria, frequency, urgency and hematuria.





Allergies: Fish, oranges, mushrooms, peanuts


Past surgical history:  and tubal ligation 


Social history: No reported


PCP: Dr. Leo 





02/15/19 20:17








- Physicial Exam


PE: 


GENERAL: Awake, alert, and fully oriented, in no acute distress


HEAD: No signs of trauma


EYES: PERRLA, EOMI, sclera anicteric, conjunctiva clear


ENT: Auricles normal inspection, hearing grossly normal, nares patent. Moist 

mucosa


NECK: Normal ROM, supple, no lymphadenopathy, JVD, or masses


ABDOMEN: +RUQ tenderness to palpation. Soft.  No guarding, no rebound.  No 

masses


EXTREMITIES: Normal range of motion, no edema.  No clubbing or cyanosis. No 

cords, erythema, or tenderness


NEUROLOGICAL: Cranial nerves II through XII grossly intact.  Normal speech. +

Right CVA tenderness.


SKIN: Warm, Dry, normal turgor, no rashes or lesions noted.





02/15/19 20:17








- Medical Decision Making


EXAM#: TYPE/EXAM: RESULT: [id number] US/ABDOMEN US -LIMITED Right upper quadrant 

abdomen ultrasound Clinical information: right upper quadrant pain; evaluate 

liver, right kidney stone 


Impression: No sonographic evidence of acute pathology. Probable diffuse 

hepatic steatosis. 


Reported By: Richi Villalta MD 02/15/19 21:14 





EXAM#: TYPE/EXAM: RESULT: [id number] CT/SPIRAL- RENAL-STONE CT Renal stone CT 

without contrast Clinical information: evaluate for renal stone; right upper 

quadrant pain  


Impression: No CT findings of acute pathology are identified. There is no 

evidence of current urolithiasis or obstructive uropathy. 


Interval passage/extraction of a small distal left ureteral calculus is noted 

in comparison to a CT study of 2018. Diffuse colonic fecal retention is 

noted which is probably moderate. 


Reported By: Richi Villalta MD 02/15/19 21:30 





Documentation prepared by MARY Luke, acting as medical scribe for 

Miquel Vizcarra MD.





02/15/19 21:59








<Rosalinda Rodriguez - Last Filed: 02/15/19 21:59>





- Resident


Resident Name: Sarah Diaz





- ED Attending Attestation


I have performed the following: I have examined & evaluated the patient, The 

case was reviewed & discussed with the resident, I agree w/resident's findings 

& plan, Exceptions are as noted





- Medical Decision Making





02/15/19 20:16





A portion of this note was documented by scribe services under my direction. I 

have reviewed the details of the note, within reason, and agree with the 

documentation with the following case summary and management plan written by 

me. 





Patient treated in the ED.





Nursing notes are reviewed and incorporated into the medical decision-making.


Vital signs reviewed.





Peripheral IV access obtained by the nurse, laboratory studies are drawn and 

sent, reviewed and interpreted by myself. 





Vital Signs











Temp Pulse Resp BP Pulse Ox


 


 98.1 F   69   22 H  118/75   100 


 


 02/15/19 18:02  02/15/19 18:02  02/15/19 18:02  02/15/19 18:02  02/15/19 18:02








38-year-old female patient with past medical history of renal stones presents 

with right flank pain for 2 weeks. Patient endorses right flank pain 

occasionally quadrant which she thought maybe kidney stones. Denies fevers, 

chills. Does report nausea but no vomiting. Denies dysuria or hematuria.





Lab work reviewed. We'll obtain a spiral CT to rule out renal colic and a right 

upper quadrant shunt rule out biliary colic. IV fluids and pain control and 

reassess.


02/15/19 21:24





Ultrasound reviewed. No acute findings.





 CBC, BMP





 02/15/19 18:00 





 02/15/19 18:00 





 CMP











Sodium  139 mmol/L (136-145)   02/15/19  18:00    


 


Potassium  4.0 mmol/L (3.5-5.1)   02/15/19  18:00    


 


Chloride  105 mmol/L ()   02/15/19  18:00    


 


Carbon Dioxide  29 mmol/L (21-32)   02/15/19  18:00    


 


Anion Gap  5 MMOL/L (8-16)  L  02/15/19  18:00    


 


BUN  12 mg/dL (7-18)   02/15/19  18:00    


 


Creatinine  0.6 mg/dL (0.55-1.3)   02/15/19  18:00    


 


Creat Clearance w eGFR  > 60  (>60)   02/15/19  18:00    


 


Random Glucose  123 mg/dL ()  H  02/15/19  18:00    


 


Calcium  9.6 mg/dL (8.5-10.1)   02/15/19  18:00    


 


Total Bilirubin  0.3 mg/dL (0.2-1)   02/15/19  18:00    


 


AST  13 U/L (15-37)  L  02/15/19  18:00    


 


ALT  19 U/L (13-61)   02/15/19  18:00    


 


Alkaline Phosphatase  75 U/L ()   02/15/19  18:00    


 


Total Protein  7.6 g/dl (6.4-8.2)   02/15/19  18:00    


 


Albumin  4.0 g/dl (3.4-5.0)   02/15/19  18:00    











02/15/19 22:07





CT scan shows no acute findings.


Could she have a passed kidney stone? Either way, the patient feels complete 

relief. She thinks that she may have passed a stone.


Either way, she feels comfortable going home.





<Miquel Vizcarra - Last Filed: 02/15/19 22:08>